# Patient Record
Sex: FEMALE | Race: WHITE | Employment: FULL TIME | URBAN - NONMETROPOLITAN AREA
[De-identification: names, ages, dates, MRNs, and addresses within clinical notes are randomized per-mention and may not be internally consistent; named-entity substitution may affect disease eponyms.]

---

## 2017-07-25 ENCOUNTER — OFFICE VISIT - GICH (OUTPATIENT)
Dept: FAMILY MEDICINE | Facility: OTHER | Age: 22
End: 2017-07-25

## 2017-07-25 ENCOUNTER — HISTORY (OUTPATIENT)
Dept: FAMILY MEDICINE | Facility: OTHER | Age: 22
End: 2017-07-25

## 2017-07-25 DIAGNOSIS — Z11.3 ENCOUNTER FOR SCREENING FOR INFECTIONS WITH PREDOMINANTLY SEXUAL MODE OF TRANSMISSION: ICD-10-CM

## 2017-07-25 DIAGNOSIS — Z30.09 ENCOUNTER FOR OTHER GENERAL COUNSELING AND ADVICE ON CONTRACEPTION: ICD-10-CM

## 2017-07-25 DIAGNOSIS — Z12.4 ENCOUNTER FOR SCREENING FOR MALIGNANT NEOPLASM OF CERVIX: ICD-10-CM

## 2017-08-09 ENCOUNTER — AMBULATORY - GICH (OUTPATIENT)
Dept: FAMILY MEDICINE | Facility: OTHER | Age: 22
End: 2017-08-09

## 2017-08-18 LAB — HPV RESULTS - HISTORICAL: POSITIVE

## 2017-09-21 ENCOUNTER — COMMUNICATION - GICH (OUTPATIENT)
Dept: FAMILY MEDICINE | Facility: OTHER | Age: 22
End: 2017-09-21
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
## 2017-12-28 NOTE — TELEPHONE ENCOUNTER
Patient Information     Patient Name MRN Sex Mary Perez [id number] Female 1995      Telephone Encounter by Fernanda Villatoro at 2017  3:57 PM     Author:  Fernanda Villatoro Service:  (none) Author Type:  (none)     Filed:  2017  3:58 PM Encounter Date:  2017 Status:  Signed     :  Fernanda Villatoro            Patient mom notified, a consent for information has been signed by Patient. Patient is currently in [facility] and unable to call back. Mom will notified Patient.  Fernanda Villatoro LPN .............2017  3:58 PM

## 2017-12-28 NOTE — ADDENDUM NOTE
Patient Information     Patient Name MRN Mary Reed [id number] Female 1995      Addendum Note by Miriam Tate at 2017 10:52 AM     Author:  Miriam Tate Service:  (none) Author Type:  (none)     Filed:  2017 10:52 AM Encounter Date:  2017 Status:  Signed     :  Miriam Tate       Addended by: MIRIAM TATE on: 2017 10:52 AM        Modules accepted: Orders

## 2017-12-28 NOTE — TELEPHONE ENCOUNTER
Patient Information     Patient Name MRN Mary Reed [id number] Female 1995      Telephone Encounter by Fernanda Villatoro at 2017 11:21 AM     Author:  Fernanda Villatoro Service:  (none) Author Type:  (none)     Filed:  2017 11:22 AM Encounter Date:  2017 Status:  Signed     :  Fernanda Villatoro            Left message to call back.  Fernanda Villatoro LPN ....................  2017   11:21 AM

## 2017-12-28 NOTE — TELEPHONE ENCOUNTER
Patient Information     Patient Name MRN Sex Mary Perez [id number] Female 1995      Telephone Encounter by Fernanda Villatoro at 2017 11:21 AM     Author:  Fernanda Villatoro Service:  (none) Author Type:  (none)     Filed:  2017 11:21 AM Encounter Date:  2017 Status:  Signed     :  Fernanda Villatoro            ----- Message from Caroline Ayala MD sent at 2017 11:00 AM CDT -----  Call pt, needs repeat pap in 1year, HPV test was positive, pap shows mild atypical cells  Caroline Andrade MD  11:00 AM 2017

## 2017-12-29 NOTE — PATIENT INSTRUCTIONS
Patient Information     Patient Name MRN Sex Mary Perez [id number] Female 1995      Patient Instructions by Caroline Ayala MD at 2017  9:15 AM     Author:  Caroline Ayala MD  Service:  (none) Author Type:  Physician     Filed:  2017  9:15 AM  Encounter Date:  2017 Status:  Addendum     :  Caroline Ayala MD (Physician)        Related Notes: Original Note by Caroline Ayala MD (Physician) filed at 2017  9:15 AM               Index [place] Exercises   Sacroiliac Joint Pain   ________________________________________________________________________  KEY POINTS    Sacroiliac joint pain is discomfort or pain in a joint in your lower back or buttocks, below your waist.    Treatment may include physical therapy, stretching and strengthening exercises, massage, chiropractic adjustment, and sometimes medicine.    To help prevent sacroiliac joint pain, do warm-up exercises and stretching before activities, and try not to twist when you lift heavy objects.  ________________________________________________________________________  What is sacroiliac joint pain?  Sacroiliac joint pain is discomfort or pain in a joint in your lower back or buttocks, below your waist. The sacroiliac joints are the joints between your lower backbone and your hip bones. Strong bands of tissue called ligaments hold the bones of the joints in place.  Pain in the sacroiliac joint is also called sacroiliac joint dysfunction.  What is the cause?  Some possible causes of sacroiliac joint pain are:    Activities that involve twisting, bending, or heavy lifting, like swinging a golf club or shoveling snow    Injury from a fall    Imbalance of muscles because one leg is shorter than the other    Poor posture    Loosening of the ligaments after pregnancy  What are the symptoms?   Symptoms may include:    Pain and stiffness in the buttocks, hips, or legs (pain may start in the back and  "travel to the legs)    Trouble bending or twisting your lower back    Pain after sitting for a long time    A feeling of being \"out of alignment\"  How is it diagnosed?   Your healthcare provider will ask about your symptoms, activities, and medical history and examine you. You may have    X-rays    CT scan, which uses X-rays and a computer to show detailed pictures of the spinal cord and the tissues around it    MRI, which uses a strong magnetic field and radio waves to show detailed pictures of the spinal cord and tissues around it  How is it treated?   Your healthcare provider may recommend stretching, exercises, or other types of physical therapy. Your provider, a physical therapist, or chiropractor may use massage or other techniques to help put the joint in better alignment.  Your provider may give 1 or more shots of numbing medicine and steroid medicine into the joint.  How can I take care of myself?   To help relieve swelling and pain:    Put an ice pack, gel pack, or package of frozen vegetables wrapped in a cloth on the injured area every 3 to 4 hours for up to 20 minutes at a time.    Take nonprescription pain medicine, such as acetaminophen, ibuprofen, or naproxen. Read the label and take as directed. Unless recommended by your healthcare provider, you should not take these medicines for more than 10 days.    Nonsteroidal anti-inflammatory medicines (NSAIDs), such as ibuprofen, naproxen, and aspirin, may cause stomach bleeding and other problems. These risks increase with age.    Acetaminophen may cause liver damage or other problems. Unless recommended by your provider, don't take more than 3000 milligrams (mg) in 24 hours. To make sure you don t take too much, check other medicines you take to see if they also contain acetaminophen. Ask your provider if you need to avoid drinking alcohol while taking this medicine.  Use moist heat for up to 20 minutes at a time to help relax tight muscles or muscle " spasms. Moist heat includes heat patches or moist heating pads that you can purchase at most drugstores, a wet washcloth or towel that has been heated in the dryer, or a hot shower. Don t use heat if you have swelling.  If your legs are different lengths, special shoes or shoe inserts may help. Your healthcare provider may recommend a sacroiliac (SI) belt to help support the joint. An SI belt wraps around the hips just below the waist.  Follow your healthcare provider's instructions, including any exercises recommended by your provider. Ask your provider:    How and when you will get your test results    How long it will take to recover    If there are activities you should avoid and when you can return to your normal activities    How to take care of yourself at home    What symptoms or problems you should watch for and what to do if you have them  Make sure you know when you should come back for a checkup.  How can I help prevent sacroiliac joint pain?  Here are some things you can do to help prevent sacroiliac joint pain:    Do warm-up exercises and stretching before activities to help prevent injuries.    Try not to twist when you lift heavy objects.    Follow safety rules and use any protective equipment recommended for your work or sport.  Developed by [employer]  Adult Advisor 2016.3 published by [employer]  Last modified: 2016-08-02  Last reviewed: 2016-07-15  This content is reviewed periodically and is subject to change as new health information becomes available. The information is intended to inform and educate and is not a replacement for medical evaluation, advice, diagnosis or treatment by a healthcare professional.  References   Adult Advisor 2016.3 Index    Copyright   2016 [employer] a division of [employer] All rights reserved.          Index [place]riac   Sacroiliac Joint Pain Exercises   Your healthcare provider may recommend exercises to help you heal. Talk to your healthcare  provider or physical therapist about which exercises will best help you and how to do them correctly and safely.  These exercises are designed to gently move your sacroiliac joint. Do not do these exercises if they cause any pain or discomfort. If you keep having pain, see your healthcare provider or physical therapist as soon as possible.    Abdominal drawing-in maneuver: Lie on your back with your knees bent and your feet flat on the floor. Try to pull your belly button in towards your spine. Hold this position for 15 seconds and then relax. Repeat 5 to 10 times.    Hamstring stretch on wall: Lie on your back with your buttocks close to a doorway. Stretch your uninjured leg straight out in front of you on the floor through the doorway. Raise your injured leg and rest it against the wall next to the door frame. Keep your leg as straight as possible. You should feel a stretch in the back of your thigh. Hold this position for 15 to 30 seconds. Repeat 3 times.    Hip flexor stretch: Kneel on one leg with your other leg in front of you at a 90 degree angle (like a lunge). Keep that your foot flat on the floor. Keep your lower back straight and lean your hips forward slightly until you feel a stretch at the front of your hip. Try not to bend forward as you do this. Hold this position for 15 to 30 seconds. Repeat 3 times with each leg.    Hip adductor stretch: Lie on your back. Bend your knees and put your feet flat on the floor. Gently spread your knees apart, stretching the muscles on the inside of your thighs. Hold the stretch for 15 to 30 seconds. Repeat 3 times.    Isometric hip adduction: Sit with your knees bent 90 degrees with a pillow placed between your knees and your feet flat on the floor. Squeeze the pillow for 5 seconds and then relax. Do 2 sets of 10.    Gluteal Sets: Lie on your stomach with your legs straight out behind you. Squeeze your buttock muscles together and hold for 5 seconds. Relax. Do 2 sets  of 10.    Lower trunk rotation: Lie on your back with your knees bent and your feet flat on the floor. Tighten your stomach muscles and push your lower back into the floor. Keeping your shoulders down flat, gently rotate your legs to one side as far as you can. Then rotate your legs to the other side. Repeat 10 to 20 times.    Single knee to chest stretch: Lie on your back with your legs straight out in front of you. Bring one knee up to your chest and grasp the back of your thigh. Pull your knee toward your chest, stretching your buttock muscle. Hold this position for 15 to 30 seconds and then return to the starting position. Repeat 3 times on each side.    Double knee to chest: Lie on your back with your knees bent and your feet flat on the floor. Tighten your stomach muscles and push your lower back into the floor. Pull both knees up to your chest. Hold for 5 seconds. Relax and then repeat 3 times.    Resisted hip extension: Stand facing a door with elastic tubing tied around the ankle of your injured side. Knot the other end of the tubing and shut the knot in the door near the floor. Draw your abdomen in towards your spine and tighten your abdominal muscles. Pull the leg with the tubing straight back, keeping your leg straight. Make sure you do not lean forward. Return to the starting position. Do 2 sets of 10.    Clam exercise: Lie on your uninjured side with your hips and knees bent and feet together. Slowly raise your top leg toward the ceiling while keeping your heels touching each other. Hold for 2 seconds and lower slowly. Do 2 sets of 15 repetitions.  Developed by [employer].  Adult Advisor 2016.3 published by [employer].  Last modified: 2016-04-25  Last reviewed: 2016-04-25  This content is reviewed periodically and is subject to change as new health information becomes available. The information is intended to inform and educate and is not a replacement for medical evaluation, advice, diagnosis or  treatment by a healthcare professional.  References   Adult Advisor 2016.3 Index    Copyright   2016 [employer] a division of [employer] All rights reserved.

## 2017-12-30 NOTE — NURSING NOTE
Patient Information     Patient Name MRN Sex Mary Perez [id number] Female 1995      Nursing Note by Fernanda Villatoro at 2017  8:45 AM     Author:  Fernanda Villatoro Service:  (none) Author Type:  (none)     Filed:  2017  9:14 AM Encounter Date:  2017 Status:  Signed     :  Fernanda Villatoro            Patient is here for annual physical, would like to have STD testing. States did have chlamydia in march was treated but has new boyfriend and would like to make sure all is well.  Fernanda Villatoro LPN .............2017  8:50 AM

## 2018-01-27 VITALS
HEART RATE: 72 BPM | BODY MASS INDEX: 22.57 KG/M2 | HEIGHT: 67 IN | SYSTOLIC BLOOD PRESSURE: 100 MMHG | DIASTOLIC BLOOD PRESSURE: 64 MMHG | WEIGHT: 143.8 LBS

## 2018-02-07 ENCOUNTER — DOCUMENTATION ONLY (OUTPATIENT)
Dept: FAMILY MEDICINE | Facility: OTHER | Age: 23
End: 2018-02-07

## 2018-02-07 PROBLEM — R87.612 LGSIL ON PAP SMEAR OF CERVIX: Status: ACTIVE | Noted: 2017-08-09

## 2018-02-07 RX ORDER — LEVONORGESTREL AND ETHINYL ESTRADIOL 0.15-0.03
1 KIT ORAL DAILY
COMMUNITY
Start: 2017-07-25 | End: 2018-08-02

## 2018-02-07 RX ORDER — LEVONORGESTREL AND ETHINYL ESTRADIOL 0.15-0.03
1 KIT ORAL DAILY
COMMUNITY
Start: 2017-07-25 | End: 2018-07-24

## 2018-03-25 ENCOUNTER — HEALTH MAINTENANCE LETTER (OUTPATIENT)
Age: 23
End: 2018-03-25

## 2018-07-24 ENCOUNTER — OFFICE VISIT (OUTPATIENT)
Dept: FAMILY MEDICINE | Facility: OTHER | Age: 23
End: 2018-07-24
Attending: FAMILY MEDICINE
Payer: COMMERCIAL

## 2018-07-24 VITALS
BODY MASS INDEX: 23.73 KG/M2 | SYSTOLIC BLOOD PRESSURE: 120 MMHG | HEIGHT: 67 IN | HEART RATE: 66 BPM | WEIGHT: 151.2 LBS | DIASTOLIC BLOOD PRESSURE: 72 MMHG

## 2018-07-24 DIAGNOSIS — Z12.4 CERVICAL CANCER SCREENING: ICD-10-CM

## 2018-07-24 DIAGNOSIS — Z23 NEED FOR TDAP VACCINATION: ICD-10-CM

## 2018-07-24 DIAGNOSIS — Z00.00 ROUTINE GENERAL MEDICAL EXAMINATION AT A HEALTH CARE FACILITY: Primary | ICD-10-CM

## 2018-07-24 DIAGNOSIS — R14.0 BLOATING: ICD-10-CM

## 2018-07-24 LAB
ERYTHROCYTE [DISTWIDTH] IN BLOOD BY AUTOMATED COUNT: 11.9 % (ref 10–15)
HCT VFR BLD AUTO: 39.8 % (ref 35–47)
HGB BLD-MCNC: 13.6 G/DL (ref 11.7–15.7)
MCH RBC QN AUTO: 30.6 PG (ref 26.5–33)
MCHC RBC AUTO-ENTMCNC: 34.2 G/DL (ref 31.5–36.5)
MCV RBC AUTO: 89 FL (ref 78–100)
PLATELET # BLD AUTO: 238 10E9/L (ref 150–450)
RBC # BLD AUTO: 4.45 10E12/L (ref 3.8–5.2)
WBC # BLD AUTO: 5.6 10E9/L (ref 4–11)

## 2018-07-24 PROCEDURE — 83516 IMMUNOASSAY NONANTIBODY: CPT | Performed by: FAMILY MEDICINE

## 2018-07-24 PROCEDURE — 36415 COLL VENOUS BLD VENIPUNCTURE: CPT | Performed by: FAMILY MEDICINE

## 2018-07-24 PROCEDURE — 85027 COMPLETE CBC AUTOMATED: CPT | Performed by: FAMILY MEDICINE

## 2018-07-24 PROCEDURE — 88141 CYTOPATH C/V INTERPRET: CPT | Performed by: FAMILY MEDICINE

## 2018-07-24 PROCEDURE — 90715 TDAP VACCINE 7 YRS/> IM: CPT | Performed by: FAMILY MEDICINE

## 2018-07-24 PROCEDURE — 88142 CYTOPATH C/V THIN LAYER: CPT | Mod: ZL | Performed by: FAMILY MEDICINE

## 2018-07-24 PROCEDURE — 87624 HPV HI-RISK TYP POOLED RSLT: CPT | Performed by: FAMILY MEDICINE

## 2018-07-24 PROCEDURE — 99395 PREV VISIT EST AGE 18-39: CPT | Mod: 25 | Performed by: FAMILY MEDICINE

## 2018-07-24 PROCEDURE — 90471 IMMUNIZATION ADMIN: CPT | Performed by: FAMILY MEDICINE

## 2018-07-24 PROCEDURE — G0123 SCREEN CERV/VAG THIN LAYER: HCPCS | Performed by: FAMILY MEDICINE

## 2018-07-24 ASSESSMENT — PAIN SCALES - GENERAL: PAINLEVEL: NO PAIN (0)

## 2018-07-24 NOTE — LETTER
July 26, 2018      Mary June  [address] [address]        Dear ,    We are writing to inform you of your test results.    Screening tests for Celiac disease were negative. This does not eliminate the possibility of celiac but much less likely. If symptoms persist or worsen, you could consider seeing GI specialist.    Resulted Orders   Tissue transglutaminase Ab IgA and IgG   Result Value Ref Range    Tissue Transglutaminase Antibody IgA <1 <7 U/mL      Comment:      Negative  The tTG-IgA assay has limited utility for patients with decreased levels of   IgA. Screening for celiac disease should include IgA testing to rule out   selective IgA deficiency and to guide selection and interpretation of   serological testing. tTG-IgG testing may be positive in celiac disease   patients with IgA deficiency.      Tissue Transglutaminase Marcelina IgG <1 <7 U/mL      Comment:      Negative   CBC W PLT No Diff   Result Value Ref Range    WBC 5.6 4.0 - 11.0 10e9/L    RBC Count 4.45 3.8 - 5.2 10e12/L    Hemoglobin 13.6 11.7 - 15.7 g/dL    Hematocrit 39.8 35.0 - 47.0 %    MCV 89 78 - 100 fl    MCH 30.6 26.5 - 33.0 pg    MCHC 34.2 31.5 - 36.5 g/dL    RDW 11.9 10.0 - 15.0 %    Platelet Count 238 150 - 450 10e9/L       If you have any questions or concerns, please call the clinic at the number listed above.       Sincerely,        Caroline Ayala MD

## 2018-07-24 NOTE — NURSING NOTE
Patient is here for annual physical, patient has concerns of stomach issues, wondering about having testing done for gluten intolerance or IBS.  Patient also needs form completed for school.  Fernanda Villatoro LPN .............7/24/2018     10:14 AM

## 2018-07-24 NOTE — MR AVS SNAPSHOT
"              After Visit Summary   7/24/2018    Mary June    MRN: [id number]           Patient Information     Date Of Birth          1995        Visit Information        Provider Department      7/24/2018 10:15 AM Caroline Chacon MD [facility]        Today's Diagnoses     Routine general medical examination at a health care facility    -  1    Need for Tdap vaccination        Cervical cancer screening        Bloating           Follow-ups after your visit        Future tests that were ordered for you today     Open Future Orders        Priority Expected Expires Ordered    HPV High Risk Types DNA Cervical Routine  7/24/2019 7/24/2018            Who to contact     If you have questions or need follow up information about today's clinic visit or your schedule please contact [facility] AND [facility] directly at [phone].  Normal or non-critical lab and imaging results will be communicated to you by MyChart, letter or phone within 4 business days after the clinic has received the results. If you do not hear from us within 7 days, please contact the clinic through MyChart or phone. If you have a critical or abnormal lab result, we will notify you by phone as soon as possible.  Submit refill requests through [employer] or call your pharmacy and they will forward the refill request to us. Please allow 3 business days for your refill to be completed.          Additional Information About Your Visit        MyChart Information     [employer] lets you send messages to your doctor, view your test results, renew your prescriptions, schedule appointments and more. To sign up, go to [email or web address] . Click on \"Log in\" on the left side of the screen, which will take you to the Welcome page. Then click on \"Sign up Now\" on the right side of the page.     You will be asked to enter the access code listed below, as well as some personal information. Please follow the " "directions to create your username and password.     Your access code is: [email or web address]  Expires: 10/22/2018 10:17 AM     Your access code will  in 90 days. If you need help or a new code, please call your [facility] clinic or [phone].        Care EveryWhere ID     This is your Care EveryWhere ID. This could be used by other organizations to access your [facility] medical records  [id number]        Your Vitals Were     Pulse Height Last Period Breastfeeding? BMI (Body Mass Index)       66 5' 6.5\" (1.689 m) 2018 No 24.04 kg/m2        Blood Pressure from Last 3 Encounters:   18 120/72   17 100/64   16 92/55    Weight from Last 3 Encounters:   18 151 lb 3.2 oz (68.6 kg)   17 143 lb 12.8 oz (65.2 kg)   16 126 lb (57.2 kg)              We Performed the Following     CBC W PLT No Diff     GH IMM-  TDAP VACCINE (BOOSTRIX )     Pap Screen Thin Prep     Tissue transglutaminase Ab IgA and IgG          Today's Medication Changes          These changes are accurate as of 18 10:54 AM.  If you have any questions, ask your nurse or doctor.               These medicines have changed or have updated prescriptions.        Dose/Directions    levonorgestrel-ethinyl estradiol 0.15-30 MG-MCG per tablet   Commonly known as:  MAYDA   This may have changed:  Another medication with the same name was removed. Continue taking this medication, and follow the directions you see here.   Changed by:  Caroline Chacon MD        Dose:  1 tablet   Take 1 tablet by mouth daily   Refills:  0                Primary Care Provider Office Phone # Fax #    Caroline Andrade [phone] [address] [facility] [address]        Equal Access to Services     [facility]: Shad Gerardo, wagloriada luqadaha, qaybta kaalcodie sterling. So [facility] [phone].    ATENCIÓN: Si mellissa fay, " tiene a avendano disposición servicios gratuitos de asistencia lingüística. Benita greene [phone].    We comply with applicable federal civil rights laws and [place] laws. We do not discriminate on the basis of race, color, national origin, age, disability, sex, sexual orientation, or gender identity.            Thank you!     Thank you for choosing [facility] AND [facility]  for your care. Our goal is always to provide you with excellent care. Hearing back from our patients is one way we can continue to improve our services. Please take a few minutes to complete the written survey that you may receive in the mail after your visit with us. Thank you!             Your Updated Medication List - Protect others around you: Learn how to safely use, store and throw away your medicines at www.disposemymeds.org.          This list is accurate as of 7/24/18 10:54 AM.  Always use your most recent med list.                   Brand Name Dispense Instructions for use Diagnosis    levonorgestrel-ethinyl estradiol 0.15-30 MG-MCG per tablet    MAYDA     Take 1 tablet by mouth daily
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
## 2018-07-25 LAB
TTG IGA SER-ACNC: <1 U/ML
TTG IGG SER-ACNC: <1 U/ML

## 2018-08-02 ENCOUNTER — TELEPHONE (OUTPATIENT)
Dept: FAMILY MEDICINE | Facility: OTHER | Age: 23
End: 2018-08-02

## 2018-08-02 DIAGNOSIS — Z01.84 IMMUNITY STATUS TESTING: Primary | ICD-10-CM

## 2018-08-02 DIAGNOSIS — Z30.41 ENCOUNTER FOR SURVEILLANCE OF CONTRACEPTIVE PILLS: ICD-10-CM

## 2018-08-02 DIAGNOSIS — Z12.4 CERVICAL CANCER SCREENING: ICD-10-CM

## 2018-08-02 RX ORDER — LEVONORGESTREL AND ETHINYL ESTRADIOL 0.15-0.03
KIT ORAL
Qty: 84 TABLET | Refills: 4 | Status: CANCELLED | OUTPATIENT
Start: 2018-08-02

## 2018-08-02 RX ORDER — LEVONORGESTREL AND ETHINYL ESTRADIOL 0.15-0.03
1 KIT ORAL DAILY
Qty: 84 TABLET | Refills: 3 | Status: SHIPPED | OUTPATIENT
Start: 2018-08-02

## 2018-08-02 NOTE — TELEPHONE ENCOUNTER
Patient requesting to have lab orders to test for immunity to Hep B for grad school. Would like to have them mailed.  Patient address:  [address] [address]      Fernanda Villatoro LPN .............8/2/2018     3:35 PM

## 2018-08-09 LAB
FINAL DIAGNOSIS: ABNORMAL
HPV HR 12 DNA CVX QL NAA+PROBE: POSITIVE
HPV16 DNA SPEC QL NAA+PROBE: NEGATIVE
HPV18 DNA SPEC QL NAA+PROBE: NEGATIVE
SPECIMEN DESCRIPTION: ABNORMAL
SPECIMEN SOURCE CVX/VAG CYTO: ABNORMAL

## 2018-08-28 ENCOUNTER — TELEPHONE (OUTPATIENT)
Dept: FAMILY MEDICINE | Facility: OTHER | Age: 23
End: 2018-08-28

## 2018-08-28 DIAGNOSIS — R87.610 PAPANICOLAOU SMEAR OF CERVIX WITH ATYPICAL SQUAMOUS CELLS OF UNDETERMINED SIGNIFICANCE (ASC-US): Primary | ICD-10-CM

## 2018-08-28 NOTE — TELEPHONE ENCOUNTER
"Mary states that her PAP smear came back abnormal so it is recommended that she get a colposcopy. She is now in [place] and is getting a colposcopy done at [facility] on Friday 8/31. They will not do procedure unless they have a \"request\" from the doctor to do one. Can Colin Ayala MD place this request?    Fax # is [phone]      Lanie Allan............................... 8/28/2018 2:05 PM    "

## 2019-08-13 ENCOUNTER — OFFICE VISIT (OUTPATIENT)
Dept: INTERNAL MEDICINE | Facility: OTHER | Age: 24
End: 2019-08-13
Attending: NURSE PRACTITIONER
Payer: COMMERCIAL

## 2019-08-13 VITALS
TEMPERATURE: 98 F | RESPIRATION RATE: 16 BRPM | OXYGEN SATURATION: 95 % | BODY MASS INDEX: 23.2 KG/M2 | HEIGHT: 67 IN | HEART RATE: 76 BPM | SYSTOLIC BLOOD PRESSURE: 110 MMHG | DIASTOLIC BLOOD PRESSURE: 58 MMHG | WEIGHT: 147.8 LBS

## 2019-08-13 DIAGNOSIS — Z78.9 VEGAN: ICD-10-CM

## 2019-08-13 DIAGNOSIS — Z30.41 ENCOUNTER FOR SURVEILLANCE OF CONTRACEPTIVE PILLS: ICD-10-CM

## 2019-08-13 DIAGNOSIS — R87.612 LGSIL ON PAP SMEAR OF CERVIX: ICD-10-CM

## 2019-08-13 DIAGNOSIS — Z00.00 HEALTHCARE MAINTENANCE: Primary | ICD-10-CM

## 2019-08-13 LAB
ERYTHROCYTE [DISTWIDTH] IN BLOOD BY AUTOMATED COUNT: 11.9 % (ref 10–15)
HCT VFR BLD AUTO: 40.6 % (ref 35–47)
HGB BLD-MCNC: 13.9 G/DL (ref 11.7–15.7)
MCH RBC QN AUTO: 30.3 PG (ref 26.5–33)
MCHC RBC AUTO-ENTMCNC: 34.2 G/DL (ref 31.5–36.5)
MCV RBC AUTO: 89 FL (ref 78–100)
PLATELET # BLD AUTO: 227 10E9/L (ref 150–450)
RBC # BLD AUTO: 4.58 10E12/L (ref 3.8–5.2)
VIT B12 SERPL-MCNC: 554 PG/ML (ref 180–914)
WBC # BLD AUTO: 5.9 10E9/L (ref 4–11)

## 2019-08-13 PROCEDURE — 87624 HPV HI-RISK TYP POOLED RSLT: CPT | Mod: ZL | Performed by: NURSE PRACTITIONER

## 2019-08-13 PROCEDURE — 88142 CYTOPATH C/V THIN LAYER: CPT | Performed by: NURSE PRACTITIONER

## 2019-08-13 PROCEDURE — 85027 COMPLETE CBC AUTOMATED: CPT | Mod: ZL | Performed by: NURSE PRACTITIONER

## 2019-08-13 PROCEDURE — 36415 COLL VENOUS BLD VENIPUNCTURE: CPT | Mod: ZL | Performed by: NURSE PRACTITIONER

## 2019-08-13 PROCEDURE — 82607 VITAMIN B-12: CPT | Mod: ZL | Performed by: NURSE PRACTITIONER

## 2019-08-13 PROCEDURE — 99395 PREV VISIT EST AGE 18-39: CPT | Performed by: NURSE PRACTITIONER

## 2019-08-13 PROCEDURE — 40001026 ZZHCL STATISTICAL PAP TEST QC: Performed by: NURSE PRACTITIONER

## 2019-08-13 SDOH — HEALTH STABILITY: MENTAL HEALTH: HOW OFTEN DO YOU HAVE A DRINK CONTAINING ALCOHOL?: MONTHLY OR LESS

## 2019-08-13 ASSESSMENT — ENCOUNTER SYMPTOMS
POLYPHAGIA: 0
ARTHRALGIAS: 0
SHORTNESS OF BREATH: 0
WHEEZING: 0
ABDOMINAL PAIN: 0
ADENOPATHY: 0
CHILLS: 0
HEADACHES: 0
COUGH: 0
MYALGIAS: 0
NAUSEA: 0
NUMBNESS: 0
PALPITATIONS: 0
NERVOUS/ANXIOUS: 0
VOMITING: 0
SORE THROAT: 0
DYSPHORIC MOOD: 0
PARESTHESIAS: 0
POLYDIPSIA: 0
DYSURIA: 0
FEVER: 0
HEARTBURN: 0
FREQUENCY: 0
DIARRHEA: 0
UNEXPECTED WEIGHT CHANGE: 0
HEMATOCHEZIA: 0
HEMATURIA: 0
CONSTIPATION: 0
DIZZINESS: 0

## 2019-08-13 ASSESSMENT — MIFFLIN-ST. JEOR: SCORE: 1463.17

## 2019-08-13 ASSESSMENT — PAIN SCALES - GENERAL: PAINLEVEL: NO PAIN (0)
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
## 2019-08-13 NOTE — NURSING NOTE
Chief Complaint   Patient presents with     Physical       Medication Reconciliation: complete  Rhonda Paul LPN  ..................8/13/2019   11:18 AM

## 2019-08-20 LAB
COPATH REPORT: NORMAL
FINAL DIAGNOSIS: NORMAL
HPV HR 12 DNA CVX QL NAA+PROBE: NEGATIVE
HPV16 DNA SPEC QL NAA+PROBE: NEGATIVE
HPV18 DNA SPEC QL NAA+PROBE: NEGATIVE
PAP: NORMAL
SPECIMEN DESCRIPTION: NORMAL
SPECIMEN SOURCE CVX/VAG CYTO: NORMAL

## 2019-09-24 ENCOUNTER — NURSE TRIAGE (OUTPATIENT)
Dept: FAMILY MEDICINE | Facility: OTHER | Age: 24
End: 2019-09-24

## 2019-09-24 NOTE — TELEPHONE ENCOUNTER
"Updated the patient on the information provided from the PCP. Patient responded \"Even though I  knows the other person has a yeast infection, she cant prescribe me something as I am living in [place]\". I restated yes, because it could be other things you need to be examined before a provider can prescribe you something. She responded \"Well I live in [place]\". I then mentioned then she will need to see someone in [place]. Dominique Rodriguez RN  ....................  9/24/2019   4:54 PM      "
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
## 2019-09-24 NOTE — TELEPHONE ENCOUNTER
Needs to have examine. Could also be gonorrhea so needs check before treatment  Caroline Ayala MD

## 2020-03-11 ENCOUNTER — HEALTH MAINTENANCE LETTER (OUTPATIENT)
Age: 25
End: 2020-03-11

## 2020-12-21 ENCOUNTER — ALLIED HEALTH/NURSE VISIT (OUTPATIENT)
Dept: FAMILY MEDICINE | Facility: OTHER | Age: 25
End: 2020-12-21
Attending: FAMILY MEDICINE
Payer: COMMERCIAL

## 2020-12-21 DIAGNOSIS — R51.9 HEADACHE: Primary | ICD-10-CM

## 2020-12-21 PROCEDURE — U0003 INFECTIOUS AGENT DETECTION BY NUCLEIC ACID (DNA OR RNA); SEVERE ACUTE RESPIRATORY SYNDROME CORONAVIRUS 2 (SARS-COV-2) (CORONAVIRUS DISEASE [COVID-19]), AMPLIFIED PROBE TECHNIQUE, MAKING USE OF HIGH THROUGHPUT TECHNOLOGIES AS DESCRIBED BY CMS-2020-01-R: HCPCS | Mod: ZL | Performed by: FAMILY MEDICINE

## 2020-12-21 PROCEDURE — C9803 HOPD COVID-19 SPEC COLLECT: HCPCS

## 2020-12-21 NOTE — NURSING NOTE
Patient swabbed for COVID-19 testing.  Symptomatic  Celeste Viramontes LPN on 12/21/2020 at 4:35 PM

## 2020-12-23 LAB
SARS-COV-2 RNA SPEC QL NAA+PROBE: NOT DETECTED
SPECIMEN SOURCE: NORMAL

## 2021-01-03 ENCOUNTER — HEALTH MAINTENANCE LETTER (OUTPATIENT)
Age: 26
End: 2021-01-03

## 2021-10-09 ENCOUNTER — HEALTH MAINTENANCE LETTER (OUTPATIENT)
Age: 26
End: 2021-10-09

## 2022-01-29 ENCOUNTER — HEALTH MAINTENANCE LETTER (OUTPATIENT)
Age: 27
End: 2022-01-29

## 2022-09-17 ENCOUNTER — HEALTH MAINTENANCE LETTER (OUTPATIENT)
Age: 27
End: 2022-09-17

## 2023-05-06 ENCOUNTER — HEALTH MAINTENANCE LETTER (OUTPATIENT)
Age: 28
End: 2023-05-06